# Patient Record
Sex: MALE | Race: WHITE | Employment: OTHER | URBAN - METROPOLITAN AREA
[De-identification: names, ages, dates, MRNs, and addresses within clinical notes are randomized per-mention and may not be internally consistent; named-entity substitution may affect disease eponyms.]

---

## 2023-06-02 LAB
ALANINE AMINOTRANSFERASE (SGPT) (U/L) IN SER/PLAS: 21 U/L (ref 10–52)
ALBUMIN (G/DL) IN SER/PLAS: 4.4 G/DL (ref 3.4–5)
ALKALINE PHOSPHATASE (U/L) IN SER/PLAS: 77 U/L (ref 33–136)
ANION GAP IN SER/PLAS: 13 MMOL/L (ref 10–20)
ASPARTATE AMINOTRANSFERASE (SGOT) (U/L) IN SER/PLAS: 15 U/L (ref 9–39)
BILIRUBIN TOTAL (MG/DL) IN SER/PLAS: 0.8 MG/DL (ref 0–1.2)
CALCIUM (MG/DL) IN SER/PLAS: 9.8 MG/DL (ref 8.6–10.6)
CARBON DIOXIDE, TOTAL (MMOL/L) IN SER/PLAS: 24 MMOL/L (ref 21–32)
CHLORIDE (MMOL/L) IN SER/PLAS: 106 MMOL/L (ref 98–107)
CHOLESTEROL (MG/DL) IN SER/PLAS: 186 MG/DL (ref 0–199)
CHOLESTEROL IN HDL (MG/DL) IN SER/PLAS: 39 MG/DL
CHOLESTEROL/HDL RATIO: 4.8
CREATININE (MG/DL) IN SER/PLAS: 0.64 MG/DL (ref 0.5–1.3)
ESTIMATED AVERAGE GLUCOSE FOR HBA1C: 114 MG/DL
GFR MALE: >90 ML/MIN/1.73M2
GLUCOSE (MG/DL) IN SER/PLAS: 99 MG/DL (ref 74–99)
HEMOGLOBIN A1C/HEMOGLOBIN TOTAL IN BLOOD: 5.6 %
LDL: 133 MG/DL (ref 0–99)
MAGNESIUM (MG/DL) IN SER/PLAS: 2.19 MG/DL (ref 1.6–2.4)
POTASSIUM (MMOL/L) IN SER/PLAS: 4.2 MMOL/L (ref 3.5–5.3)
PROTEIN TOTAL: 7.3 G/DL (ref 6.4–8.2)
SODIUM (MMOL/L) IN SER/PLAS: 139 MMOL/L (ref 136–145)
TRIGLYCERIDE (MG/DL) IN SER/PLAS: 69 MG/DL (ref 0–149)
UREA NITROGEN (MG/DL) IN SER/PLAS: 15 MG/DL (ref 6–23)
VLDL: 14 MG/DL (ref 0–40)

## 2023-10-06 ENCOUNTER — TELEPHONE (OUTPATIENT)
Dept: CARDIOLOGY | Facility: CLINIC | Age: 68
End: 2023-10-06
Payer: MEDICARE

## 2023-10-09 NOTE — TELEPHONE ENCOUNTER
Per Dr. Pisano, pt is low risk from a cardiac standpoint. Clearance completed and faxed to Dr. Forman.

## 2024-05-17 PROBLEM — R03.0 ELEVATED BLOOD PRESSURE READING WITHOUT DIAGNOSIS OF HYPERTENSION: Status: ACTIVE | Noted: 2024-05-17

## 2024-05-17 PROBLEM — I10 HYPERTENSION: Status: ACTIVE | Noted: 2024-05-17

## 2024-05-17 PROBLEM — R03.0 ELEVATED BLOOD PRESSURE READING WITHOUT DIAGNOSIS OF HYPERTENSION: Status: RESOLVED | Noted: 2024-05-17 | Resolved: 2024-05-17

## 2024-05-17 PROBLEM — G56.00 CARPAL TUNNEL SYNDROME: Status: ACTIVE | Noted: 2024-05-17

## 2024-05-17 PROBLEM — R53.83 FATIGUE: Status: ACTIVE | Noted: 2024-05-17

## 2024-06-10 ENCOUNTER — OFFICE VISIT (OUTPATIENT)
Dept: CARDIOLOGY | Facility: CLINIC | Age: 69
End: 2024-06-10
Payer: MEDICARE

## 2024-06-10 VITALS
BODY MASS INDEX: 33.32 KG/M2 | OXYGEN SATURATION: 98 % | WEIGHT: 246 LBS | HEART RATE: 60 BPM | HEIGHT: 72 IN | SYSTOLIC BLOOD PRESSURE: 158 MMHG | DIASTOLIC BLOOD PRESSURE: 80 MMHG

## 2024-06-10 DIAGNOSIS — I10 HYPERTENSION, UNSPECIFIED TYPE: Primary | ICD-10-CM

## 2024-06-10 DIAGNOSIS — E78.49 OTHER HYPERLIPIDEMIA: ICD-10-CM

## 2024-06-10 PROCEDURE — 3077F SYST BP >= 140 MM HG: CPT | Performed by: STUDENT IN AN ORGANIZED HEALTH CARE EDUCATION/TRAINING PROGRAM

## 2024-06-10 PROCEDURE — 99215 OFFICE O/P EST HI 40 MIN: CPT | Performed by: STUDENT IN AN ORGANIZED HEALTH CARE EDUCATION/TRAINING PROGRAM

## 2024-06-10 PROCEDURE — 3079F DIAST BP 80-89 MM HG: CPT | Performed by: STUDENT IN AN ORGANIZED HEALTH CARE EDUCATION/TRAINING PROGRAM

## 2024-06-10 PROCEDURE — 93000 ELECTROCARDIOGRAM COMPLETE: CPT | Performed by: STUDENT IN AN ORGANIZED HEALTH CARE EDUCATION/TRAINING PROGRAM

## 2024-06-10 RX ORDER — ATORVASTATIN CALCIUM 40 MG/1
40 TABLET, FILM COATED ORAL NIGHTLY
COMMUNITY
Start: 2023-10-01

## 2024-06-10 RX ORDER — AMLODIPINE BESYLATE 5 MG/1
5 TABLET ORAL
COMMUNITY
Start: 2022-10-17

## 2024-06-10 RX ORDER — HYDROCHLOROTHIAZIDE 25 MG/1
25 TABLET ORAL DAILY
Qty: 90 TABLET | Refills: 3 | Status: SHIPPED | OUTPATIENT
Start: 2024-06-10 | End: 2025-06-10

## 2024-06-10 NOTE — PROGRESS NOTES
Signed  Encounter Date: 6/14/2023  Fabiano Pisano MD PhD         Diagnoses/Problems  Assessed    · Abnormal EKG (794.31) (R94.31)   · Hypertension (401.9) (I10)   · Hyperlipidemia (272.4) (E78.5)     Orders  Abnormal EKG, Hypertension    · Comprehensive Metabolic Panel; Status:Active - Retrospective By Protocol  Authorization; Requested for:[id number];    · Lipid Panel; Status:Active - Retrospective By Protocol Authorization; Requested  for:[id number];    · Magnesium, Serum; Status:Active - Retrospective By Protocol Authorization; Requested  for:[id number];   Hyperlipidemia    · Start: Atorvastatin Calcium 40 MG Oral Tablet; TAKE 1 TABLET AT BEDTIME     History of Present Illness  May 31, 2023  Patient is a 67-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 He was found to have abnormal EKG in May 2023 with sinus rhythm at 77 with frequent PVCs and no significant ST-T changes. He also has some lower extremity edema and shortness of breath with exertion. The only medication he takes regularly is amlodipine 5 mg for blood pressure control as well as meloxicam for pain in joints. Used to be a smoker long time ago. No significant family history for premature coronary artery disease.     June 14, 2023  Patient had his Lexiscan nuclear stress test done in June 2023 that did not show evidence of ischemia and had normal ejection fraction. His echocardiogram also showed ejection fraction of 50 to 55% with no significant valvular abnormality. His labs showed potassium 4.2, creatinine 0.6, hemoglobin A1c 5.6, magnesium 2.1, . Denies having any chest pain or shortness of breath.              Follow up visit    06/10/24    Pb Baker is a 68 y.o. male who is here for annual follow-up.      Patient denies any chest pain, shortness of breath, palpitation.    EKG today shows normal sinus rhythm with normal axis and no  significant ST-T changes.  Poor R progression.  Rate is at 53          Past Medical History  Past Medical History:   Diagnosis Date    Diverticulitis of intestine, part unspecified, without perforation or abscess without bleeding     Diverticulitis    Essential (primary) hypertension 09/02/2015    Hypertension        Past Surgical History  Past Surgical History:   Procedure Laterality Date    COLECTOMY  09/29/2014    Partial Colectomy        Social history  Social History     Socioeconomic History    Marital status:      Spouse name: Not on file    Number of children: Not on file    Years of education: Not on file    Highest education level: Not on file   Occupational History    Not on file   Tobacco Use    Smoking status: Not on file    Smokeless tobacco: Not on file   Substance and Sexual Activity    Alcohol use: Not on file    Drug use: Not on file    Sexual activity: Not on file   Other Topics Concern    Not on file   Social History Narrative    Not on file     Social Determinants of Health     Financial Resource Strain: Not on file   Food Insecurity: Not on file   Transportation Needs: Not on file   Physical Activity: Not on file   Stress: Not on file   Social Connections: Not on file   Intimate Partner Violence: Not on file   Housing Stability: Not on file        Family History  No family history on file.     12 system point of review is negative except for what described in history of present illness    Allergies:  No Known Allergies     Outpatient Medications:  No current outpatient medications      Last Recorded Vitals:      5/31/2023    10:36 AM 6/14/2023    10:48 AM 6/10/2024     1:26 PM   Vitals   Systolic 127 128 158   Diastolic 70 74 80   Heart Rate 67 62 60   Height (in) 1.829 m (6') 1.829 m (6') 1.829 m (6')   Weight (lb) 222 223 246   BMI 30.11 kg/m2 30.24 kg/m2 33.36 kg/m2   BSA (m2) 2.27 m2 2.27 m2 2.39 m2   Visit Report   Report    Visit Vitals  /80   Pulse 60   Ht 1.829 m (6')   Wt  "112 kg (246 lb)   SpO2 98%   BMI 33.36 kg/m²   BSA 2.39 m²        Physical Exam:    General: Awake, alert/oriented x3, well developed, no acute distress  Head: Atraumatic/Normocephalic  Eyes: Normal external exam, EOMI, PERRLA  ENT: Oropharynx normal, moist mucous membranes  Cardiovascular: RRR, S1/S2, no murmurs, rubs, or gallops, radial pulses +2, no edema of extremities  Pulmonary: CTAB, no respiratory distress. No wheezes, rales, or ronchi  Abdomen: +BS, soft, non-tender, nondistended, no guarding or rebound  MSK: No joint swelling, normal movements of all extremities. Range of motion- normal.  Extremities: 1+ bilateral edema, no cyanosis  Neuro: Alert/oriented x3, no focal motor or sensory deficits  Psychiatric: Judgment intact. Appropriate mood and behavior      I reviewed recent available cardiac studies.      Labs    Lab Results   Component Value Date    WBC 6.6 05/19/2023    HGB 14.9 05/19/2023    HCT 43.7 05/19/2023     05/19/2023    CHOL 186 06/02/2023    TRIG 69 06/02/2023    HDL 39.0 (A) 06/02/2023    ALT 21 06/02/2023    AST 15 06/02/2023     06/02/2023    K 4.2 06/02/2023     06/02/2023    CREATININE 0.64 06/02/2023    BUN 15 06/02/2023    CO2 24 06/02/2023    TSH 1.02 05/19/2023    HGBA1C 5.5 09/25/2023     No results found for: \"CKTOTAL\", \"CKMB\", \"CKMBINDEX\", \"TROPONINI\"     No results found for: \"INR\", \"PROTIME\"          Assessment/Plan     Repeat blood pressure check with me was 145/90.  Will start hydrochlorothiazide 25 mg daily.  Recommended low-salt diet.    We will continue with current medications.    Discussed the importance of heart healthy diet and exercise.    Follow-up in clinic in 1 year with prior labs including CMP, Mg, lipids and EKG at the time of visit.           Fabiano Pisano MD, PhD, FACC, FSCAI  Interventional Cardiology, [facility]  Associate Professor of Medicine, [facility]  Office: [phone]     "     **Disclaimer: This note was dictated by speech recognition, and every effort has been made to prevent any error in transcription, however minor errors may be present**

## 2025-05-19 PROBLEM — E78.5 HLD (HYPERLIPIDEMIA): Status: ACTIVE | Noted: 2023-06-07

## 2025-05-19 PROBLEM — M16.12 UNILATERAL PRIMARY OSTEOARTHRITIS, LEFT HIP: Status: ACTIVE | Noted: 2024-02-07

## 2025-05-19 PROBLEM — R94.31 ABNORMAL ELECTROCARDIOGRAM (ECG) (EKG): Status: ACTIVE | Noted: 2023-06-05

## 2025-05-19 PROBLEM — M54.50 LOW BACK PAIN WITH RADIATION: Status: ACTIVE | Noted: 2022-06-25

## 2025-05-19 PROBLEM — I10 ESSENTIAL (PRIMARY) HYPERTENSION: Status: ACTIVE | Noted: 2017-04-19

## 2025-06-06 LAB
ALBUMIN SERPL-MCNC: 4.6 G/DL (ref 3.6–5.1)
ALP SERPL-CCNC: 66 U/L (ref 35–144)
ALT SERPL-CCNC: 21 U/L (ref 9–46)
ANION GAP SERPL CALCULATED.4IONS-SCNC: 11 MMOL/L (CALC) (ref 7–17)
AST SERPL-CCNC: 17 U/L (ref 10–35)
BILIRUB SERPL-MCNC: 0.9 MG/DL (ref 0.2–1.2)
BUN SERPL-MCNC: 15 MG/DL (ref 7–25)
CALCIUM SERPL-MCNC: 9.3 MG/DL (ref 8.6–10.3)
CHLORIDE SERPL-SCNC: 106 MMOL/L (ref 98–110)
CHOLEST SERPL-MCNC: 130 MG/DL
CHOLEST/HDLC SERPL: 3.3 (CALC)
CO2 SERPL-SCNC: 23 MMOL/L (ref 20–32)
CREAT SERPL-MCNC: 0.61 MG/DL (ref 0.7–1.35)
EGFRCR SERPLBLD CKD-EPI 2021: 105 ML/MIN/1.73M2
GLUCOSE SERPL-MCNC: 82 MG/DL (ref 65–99)
HDLC SERPL-MCNC: 40 MG/DL
LDLC SERPL CALC-MCNC: 76 MG/DL (CALC)
MAGNESIUM SERPL-MCNC: 2 MG/DL (ref 1.5–2.5)
NONHDLC SERPL-MCNC: 90 MG/DL (CALC)
POTASSIUM SERPL-SCNC: 4.1 MMOL/L (ref 3.5–5.3)
PROT SERPL-MCNC: 6.9 G/DL (ref 6.1–8.1)
SODIUM SERPL-SCNC: 140 MMOL/L (ref 135–146)
TRIGL SERPL-MCNC: 65 MG/DL

## 2025-06-09 ENCOUNTER — APPOINTMENT (OUTPATIENT)
Dept: CARDIOLOGY | Facility: CLINIC | Age: 70
End: 2025-06-09
Payer: MEDICARE

## 2025-06-09 VITALS
OXYGEN SATURATION: 97 % | DIASTOLIC BLOOD PRESSURE: 76 MMHG | WEIGHT: 245 LBS | HEART RATE: 54 BPM | HEIGHT: 70 IN | SYSTOLIC BLOOD PRESSURE: 118 MMHG | BODY MASS INDEX: 35.07 KG/M2

## 2025-06-09 DIAGNOSIS — E78.49 OTHER HYPERLIPIDEMIA: Primary | ICD-10-CM

## 2025-06-09 DIAGNOSIS — I10 HYPERTENSION, UNSPECIFIED TYPE: ICD-10-CM

## 2025-06-09 DIAGNOSIS — I10 ESSENTIAL (PRIMARY) HYPERTENSION: ICD-10-CM

## 2025-06-09 LAB
ATRIAL RATE: 54 BPM
P AXIS: 0 DEGREES
P OFFSET: 200 MS
P ONSET: 141 MS
PR INTERVAL: 166 MS
Q ONSET: 224 MS
QRS COUNT: 9 BEATS
QRS DURATION: 96 MS
QT INTERVAL: 430 MS
QTC CALCULATION(BAZETT): 407 MS
QTC FREDERICIA: 414 MS
R AXIS: 28 DEGREES
T AXIS: -23 DEGREES
T OFFSET: 439 MS
VENTRICULAR RATE: 54 BPM

## 2025-06-09 PROCEDURE — 93005 ELECTROCARDIOGRAM TRACING: CPT | Performed by: STUDENT IN AN ORGANIZED HEALTH CARE EDUCATION/TRAINING PROGRAM

## 2025-06-09 PROCEDURE — 3008F BODY MASS INDEX DOCD: CPT | Performed by: STUDENT IN AN ORGANIZED HEALTH CARE EDUCATION/TRAINING PROGRAM

## 2025-06-09 PROCEDURE — 93010 ELECTROCARDIOGRAM REPORT: CPT | Performed by: STUDENT IN AN ORGANIZED HEALTH CARE EDUCATION/TRAINING PROGRAM

## 2025-06-09 PROCEDURE — 1159F MED LIST DOCD IN RCRD: CPT | Performed by: STUDENT IN AN ORGANIZED HEALTH CARE EDUCATION/TRAINING PROGRAM

## 2025-06-09 PROCEDURE — 3074F SYST BP LT 130 MM HG: CPT | Performed by: STUDENT IN AN ORGANIZED HEALTH CARE EDUCATION/TRAINING PROGRAM

## 2025-06-09 PROCEDURE — 3078F DIAST BP <80 MM HG: CPT | Performed by: STUDENT IN AN ORGANIZED HEALTH CARE EDUCATION/TRAINING PROGRAM

## 2025-06-09 PROCEDURE — 99202 OFFICE O/P NEW SF 15 MIN: CPT | Mod: 25 | Performed by: STUDENT IN AN ORGANIZED HEALTH CARE EDUCATION/TRAINING PROGRAM

## 2025-06-09 PROCEDURE — 99213 OFFICE O/P EST LOW 20 MIN: CPT | Performed by: STUDENT IN AN ORGANIZED HEALTH CARE EDUCATION/TRAINING PROGRAM

## 2025-06-09 NOTE — PROGRESS NOTES
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 He was found to have abnormal EKG in May 2023 with sinus rhythm at 77 with frequent PVCs and no significant ST-T changes. He also has some lower extremity edema and shortness of breath with exertion. The only medication he takes regularly is amlodipine 5 mg for blood pressure control as well as meloxicam for pain in joints. Used to be a smoker long time ago. No significant family history for premature coronary artery disease.     June 14, 2023  Patient had his Lexiscan nuclear stress test done in June 2023 that did not show evidence of ischemia and had normal ejection fraction. His echocardiogram also showed ejection fraction of 50 to 55% with no significant valvular abnormality. His labs showed potassium 4.2, creatinine 0.6, hemoglobin A1c 5.6, magnesium 2.1, . Denies having any chest pain or shortness of breath.                Follow up visit     06/10/24     Pb Baker is a 68 y.o. male who is here for annual follow-up.        Patient denies any chest pain, shortness of breath, palpitation.     EKG today shows normal sinus rhythm with normal axis and no significant ST-T changes.  Poor R progression.  Rate is at 53          Follow up visit    06/09/25    Pb Baker is a 69 y.o. male who is here for annual follow-up  Blood pressure much better.  Weight is the same.      Patient denies any chest pain, shortness of breath, palpitation.    EKG today shows normal sinus rhythm with normal axis and no significant ST-T changes.  Heart rate at 54      Past Medical History  Medical History[1]     Past Surgical History  Surgical History[2]     Social history  Social History     Socioeconomic History    Marital status:      Spouse name: Not on file    Number of children: Not on file    Years of education: Not on file    Highest  education level: Not on file   Occupational History    Not on file   Tobacco Use    Smoking status: Not on file    Smokeless tobacco: Not on file   Substance and Sexual Activity    Alcohol use: Not on file    Drug use: Not on file    Sexual activity: Not on file   Other Topics Concern    Not on file   Social History Narrative    Not on file     Social Drivers of Health     Financial Resource Strain: Patient Declined (12/3/2024)    Received from [facility]    Overall Financial Resource Strain (CARDIA)     Difficulty of Paying Living Expenses: Patient declined   Food Insecurity: Unknown (12/3/2024)    Received from [facility]    Hunger Vital Sign     Worried About Running Out of Food in the Last Year: Never true     Ran Out of Food in the Last Year: Patient declined   Transportation Needs: Patient Declined (12/3/2024)    Received from [facility]    PRAPARE - Transportation     Lack of Transportation (Medical): Patient declined     Lack of Transportation (Non-Medical): Patient declined   Physical Activity: Patient Declined (12/3/2024)    Received from [facility]    Exercise Vital Sign     Days of Exercise per Week: Patient declined     Minutes of Exercise per Session: Patient declined   Stress: Patient Declined (12/3/2024)    Received from [facility]    [employer] [place] [employer] - Occupational Stress Questionnaire     Feeling of Stress : Patient declined   Social Connections: Unknown (12/3/2024)    Received from [facility]    Social Connection and Isolation Panel [NHANES]     Frequency of Communication with Friends and Family: Patient declined     Frequency of Social Gatherings with Friends and Family: Patient declined     Attends Judaism Services: More than 4 times per year     Active Member of Clubs or Organizations: Patient declined     Attends Club or Organization Meetings: Patient declined     Marital Status:    Intimate Partner Violence: Not At Risk (12/3/2024)    Received from  "[facility]    Humiliation, Afraid, Rape, and Kick questionnaire     Fear of Current or Ex-Partner: No     Emotionally Abused: No     Physically Abused: No     Sexually Abused: No   Housing Stability: Unknown (12/3/2024)    Received from [facility]    Housing Stability Vital Sign     Unable to Pay for Housing in the Last Year: Patient declined     Number of Times Moved in the Last Year: Not on file     Homeless in the Last Year: No        Family History  Family History[3]     12 system point of review is negative except for what described in history of present illness    Allergies:  RX Allergies[4]     Outpatient Medications:  Current Outpatient Medications   Medication Instructions    amLODIPine (NORVASC) 5 mg, Daily RT    atorvastatin (LIPITOR) 40 mg, Nightly    hydroCHLOROthiazide (HYDRODIURIL) 25 mg, oral, Daily         Last Recorded Vitals:      5/31/2023    10:36 AM 6/14/2023    10:48 AM 6/10/2024     1:26 PM 6/9/2025    10:59 AM   Vitals   Systolic 127 128 158    Diastolic 70 74 80    Heart Rate 67 62 60    Height 1.829 m (6') 1.829 m (6') 1.829 m (6') 1.778 m (5' 10\")   Weight (lb) 222 223 246 245   BMI 30.11 kg/m2 30.24 kg/m2 33.36 kg/m2 35.15 kg/m2   BSA (m2) 2.27 m2 2.27 m2 2.39 m2 2.34 m2   Visit Report   Report Report    Visit Vitals  Ht 1.778 m (5' 10\")   Wt 111 kg (245 lb)   BMI 35.15 kg/m²   BSA 2.34 m²        Physical Exam:    General: Awake, alert/oriented x3, well developed, no acute distress  Head: Atraumatic/Normocephalic  Eyes: Normal external exam, EOMI, PERRLA  ENT: Oropharynx normal, moist mucous membranes  Cardiovascular: RRR, S1/S2, no murmurs, rubs, or gallops, radial pulses +2, no edema of extremities  Pulmonary: CTAB, no respiratory distress. No wheezes, rales, or ronchi  Abdomen: +BS, soft, non-tender, nondistended, no guarding or rebound  MSK: No joint swelling, normal movements of all extremities. Range of motion- normal.  Extremities: no edema, no cyanosis  Neuro: Alert/oriented " "x3, no focal motor or sensory deficits  Psychiatric: Judgment intact. Appropriate mood and behavior      I reviewed recent available cardiac studies.      Labs    Lab Results   Component Value Date    WBC 6.6 05/19/2023    HGB 14.9 05/19/2023    HCT 43.7 05/19/2023     05/19/2023    CHOL 130 06/05/2025    TRIG 65 06/05/2025    HDL 40 06/05/2025    ALT 21 06/05/2025    AST 17 06/05/2025     06/05/2025    K 4.1 06/05/2025     06/05/2025    CREATININE 0.61 (L) 06/05/2025    BUN 15 06/05/2025    CO2 23 06/05/2025    TSH 1.02 05/19/2023    HGBA1C 5.6 09/27/2024     No results found for: \"CKTOTAL\", \"CKMB\", \"CKMBINDEX\", \"TROPONINI\"     No results found for: \"INR\", \"PROTIME\"          Assessment/Plan     Patient overall is doing well from a cardiac standpoint.  Encouraged losing weight.  Goal of 20 pounds over the next year  We will continue with current medications.    Discussed the importance of heart healthy diet and exercise.    Follow-up in clinic in 1 year with prior labs including CMP, Mg, lipids and EKG at the time of visit.           Fabiano Pisano MD, PhD, [facility], [facility]  Interventional Cardiology, [facility] [place]  Associate Professor of Medicine, [facility]  Office: [phone]         **Disclaimer: This note was dictated by speech recognition, and every effort has been made to prevent any error in transcription, however minor errors may be present**         [1]   Past Medical History:  Diagnosis Date    Diverticulitis of intestine, part unspecified, without perforation or abscess without bleeding     Diverticulitis    Essential (primary) hypertension 09/02/2015    Hypertension   [2]   Past Surgical History:  Procedure Laterality Date    COLECTOMY  09/29/2014    Partial Colectomy   [3] No family history on file.  [4] No Known Allergies    "

## 2025-06-16 ENCOUNTER — APPOINTMENT (OUTPATIENT)
Dept: CARDIOLOGY | Facility: CLINIC | Age: 70
End: 2025-06-16
Payer: MEDICARE

## 2026-07-13 ENCOUNTER — APPOINTMENT (OUTPATIENT)
Dept: CARDIOLOGY | Facility: CLINIC | Age: 71
End: 2026-07-13
Payer: MEDICARE